# Patient Record
Sex: FEMALE | Race: WHITE
[De-identification: names, ages, dates, MRNs, and addresses within clinical notes are randomized per-mention and may not be internally consistent; named-entity substitution may affect disease eponyms.]

---

## 2017-06-30 ENCOUNTER — HOSPITAL ENCOUNTER (OUTPATIENT)
Dept: HOSPITAL 46 - OPS | Age: 62
Discharge: HOME | End: 2017-06-30
Attending: SURGERY
Payer: COMMERCIAL

## 2017-06-30 VITALS — WEIGHT: 186 LBS | BODY MASS INDEX: 29.19 KG/M2 | HEIGHT: 67 IN

## 2017-06-30 DIAGNOSIS — Z98.890: ICD-10-CM

## 2017-06-30 DIAGNOSIS — Z90.49: ICD-10-CM

## 2017-06-30 DIAGNOSIS — K20.9: ICD-10-CM

## 2017-06-30 DIAGNOSIS — I10: ICD-10-CM

## 2017-06-30 DIAGNOSIS — K29.50: Primary | ICD-10-CM

## 2017-06-30 DIAGNOSIS — Z90.710: ICD-10-CM

## 2017-06-30 DIAGNOSIS — K44.9: ICD-10-CM

## 2017-06-30 DIAGNOSIS — K31.7: ICD-10-CM

## 2017-06-30 DIAGNOSIS — K31.89: ICD-10-CM

## 2017-06-30 PROCEDURE — 0DB68ZX EXCISION OF STOMACH, VIA NATURAL OR ARTIFICIAL OPENING ENDOSCOPIC, DIAGNOSTIC: ICD-10-PCS | Performed by: SURGERY

## 2017-06-30 PROCEDURE — 0DB38ZX EXCISION OF LOWER ESOPHAGUS, VIA NATURAL OR ARTIFICIAL OPENING ENDOSCOPIC, DIAGNOSTIC: ICD-10-PCS | Performed by: SURGERY

## 2017-06-30 PROCEDURE — 0DB98ZX EXCISION OF DUODENUM, VIA NATURAL OR ARTIFICIAL OPENING ENDOSCOPIC, DIAGNOSTIC: ICD-10-PCS | Performed by: SURGERY

## 2017-06-30 PROCEDURE — 0DB28ZX EXCISION OF MIDDLE ESOPHAGUS, VIA NATURAL OR ARTIFICIAL OPENING ENDOSCOPIC, DIAGNOSTIC: ICD-10-PCS | Performed by: SURGERY

## 2017-06-30 PROCEDURE — 0DB78ZX EXCISION OF STOMACH, PYLORUS, VIA NATURAL OR ARTIFICIAL OPENING ENDOSCOPIC, DIAGNOSTIC: ICD-10-PCS | Performed by: SURGERY

## 2017-06-30 NOTE — NUR
06/30/17 Brice2 Holli Coleman
1239-PATIENT ARRIVED TO PACU ON 2L NC O2 SAT 98%. PATIENT AWAKE DENIES
PAIN OR NAUSEA. PATIENT DOES HAVE HICCUPS. DROWSY

## 2017-08-05 NOTE — OR
[facility]
                                    [address], [place]  [address]
_________________________________________________________________________________________
                                                                 Signed   
 
 
DATE OF PROCEDURE:  06/30/17
 
PREOPERATIVE DIAGNOSIS
Clinical gastroesophageal reflux disease, longstanding.
 
POSTOPERATIVE DIAGNOSES
Enterogastritis.
Poor flap valve and chronic esophagitis; no evidence of Simpson epithelium.
 
PROCEDURE:  Esophagogastroduodenoscopy with biopsy.
 
SURGEON:  Armando Sultana MD
 
ANESTHESIA:  Intravenous sedation, Fentanyl 100 mcg, Versed 3.5 mg.
 
INDICATION
A 62-year-old white woman, who is a patient of Dr. Jaime Gonzalez. She had a significant
reflux disease including spontaneous regurgitation, substernal burning pain, and so on.
She is currently on esomeprazole 40 mg p.o. q. day. She is admitted to undergo an upper
endoscopy with to better characterize her problem anticipating possible surgical
intervention in the future. She understands the risk of the procedure including but not
limited to bleeding, infection, perforation, and other unforeseen complications.
Understanding this, she wishes to proceed.
 
FINDINGS
Chronic esophagitis was noted, but there was no evidence of Simpson epithelium,
stricture, or neoplasm. There was no sign of varices. The s tomach itself had proximal
gastric polyps, a few only, probably related to PPI use. The antrum did have linear
erythematous erosive changes consistent with gastritis, but no ulcer proper, and there
was no evidence of cancer. The duodenum was normal. MARIA DEL CARMEN test was negative 15 minutes
post procedure.
 
DESCRIPTION OF PROCEDURE
The patient was brought to the endoscopy suite and given topical Hurricane spray,
hypopharyngeal anesthesia, and placed in a lateral decubitus position. She was given
intravenous sedation to the point of slurred speech and nystagmus. A bite-block was
placed. A full cardiopulmonary monitoring was maintained. An Olympus video upper
endoscope was passed in the hypopharynx. The vocal cords appeared normal. The
hypopharyngeal tissues were not pa r ticularly edematous. The scope was advanced in the
esophagus. Chronic inflammatory changes were noted in the mid and distal esophagus with
no stricture proper. The scope was passed into the stomach, insufflated with air, and
rugal folds appeared reasonably  normal. The antrum showed erythematous linear near
 
    Electronically Signed By: ARMANDO SULTANA MD  08/05/17 1025
_________________________________________________________________________________________
PATIENT NAME:     DEMARCO BYRNES                 
MEDICAL RECORD #: [id number]                     OPERATIVE REPORT              
          ACCT #: [id number]  
DATE OF BIRTH:   [birth year]                                       
PHYSICIAN:   ARMANDO SULTANA MD                      REPORT #: [id number]
REPORT IS CONFIDENTIAL AND NOT TO BE RELEASED WITHOUT AUTHORIZATION
 
 
                                  [facility]
                                    [address], [place]  [address]
_________________________________________________________________________________________
                                                                 Signed   
 
 
erosions of the pylorus. The scope was passed through the pylorus into the duodenum
which was normal. Overall, biopsies were taken from the duodenum to assess for celiac
disease. Careful inspection of bulbar portion showed no sign of ulcer. The scope was
withdrawn from the stomach where biopsies were taken from the prepyloric antrum,        
              pathologic testing. Retroflex view showed a marginal flap valve generally.
There was no evidence of Asaf ulcer. The scope was straightened a bit more and
withdrawn. Biopsy was taken of the distal esophagus and midesophagus as well. The
patient was taken to the recovery room in good condition after procedure having suffered
no complications.
 
CONCLUDING DIAGNOSES
 
Enterogastritis with proximal gastric polyps, probably related to PPI use. 
Poor flap valve and chronic esophagitis.
Enterogastritis.
 
PLAN
Recommend continued use of esomeprazole 40 mg p.o. q. day. We will check the pathology
reports. Consideration will be made for antireflux operation and another testing
appropriate to it. She will return to see us in approximately 4 weeks.
 
 
 
_____________________________
MD VILMA Gonzalez/Lisette
DD: 06/30/2017 12:49:13
DT: 07/02/2017 00:03:10
Job #: [id number]
 
cc:  Jaime Gonzalez MD
 
 
 
 
 
 
 
 
 
 
 
    Electronically Signed By: ARMANDO SULTANA MD  08/05/17 1025
_________________________________________________________________________________________
PATIENT NAME:     DEMARCO BYRNES                 
MEDICAL RECORD #: [id number]                     OPERATIVE REPORT              
          ACCT #: [id number]  
DATE OF BIRTH:   [birth year]                                       
PHYSICIAN:   ARMANDO SULTANA MD                      REPORT #: [id number]
REPORT IS CONFIDENTIAL AND NOT TO BE RELEASED WITHOUT AUTHORIZATION

## 2018-02-02 ENCOUNTER — HOSPITAL ENCOUNTER (INPATIENT)
Dept: HOSPITAL 46 - MS | Age: 63
LOS: 21 days | Discharge: HOME | DRG: 328 | End: 2018-02-23
Attending: SURGERY | Admitting: SURGERY
Payer: COMMERCIAL

## 2018-02-02 VITALS — WEIGHT: 181.99 LBS | HEIGHT: 67 IN | BODY MASS INDEX: 28.56 KG/M2

## 2018-02-02 DIAGNOSIS — K21.9: Primary | ICD-10-CM

## 2018-02-02 DIAGNOSIS — K44.9: ICD-10-CM

## 2018-02-02 NOTE — XMS
Encounter Summary
  Created on: 2018
 
 FITZ, DEMARCO JARRED
 External Reference #: [id number]
 : 53
 Sex: Female
 
 Demographics
 
 
+-----------------------+------------------------+
| Address               | [address]      |
|                       | [place]  [address]   |
+-----------------------+------------------------+
| Home Phone            | [phone]        |
+-----------------------+------------------------+
| Preferred Language    | Unknown                |
+-----------------------+------------------------+
| Marital Status        |                 |
+-----------------------+------------------------+
| Taoist Affiliation | Unknown                |
+-----------------------+------------------------+
| Race                  | White                  |
+-----------------------+------------------------+
| Ethnic Group          | Not  or  |
+-----------------------+------------------------+
 
 
 Author
 
 
+--------------+------------------------------+
| Author       | [facility] |
+--------------+------------------------------+
| Organization | [facility] |
+--------------+------------------------------+
| Address      | Unknown                      |
+--------------+------------------------------+
| Phone        | Unavailable                  |
+--------------+------------------------------+
 
 
 
 Support
 
 
+----------------+--------------+-------------------+-----------------+
| Name           | Relationship | Address           | Phone           |
+----------------+--------------+-------------------+-----------------+
| VANDA BYRNES | ECON         | [place], [place]   | [phone] |
+----------------+--------------+-------------------+-----------------+
 
 
 
 Care Team Providers
 
 
 
+-----------------------+------+-----------------+
| Care Team Member Name | Role | Phone           |
+-----------------------+------+-----------------+
| Flora Castellanos MD    | PCP  | [phone] |
+-----------------------+------+-----------------+
 
 
 
 Encounter Details
 
 
+--------+-------------+----------------------+----------------------+-------------+
| Date   | Type        | Department           | Care Team            | Description |
+--------+-------------+----------------------+----------------------+-------------+
| / | Documentati |   Otolaryngology     |   Heidi Montes   |             |
| 2018   | on          | Audiology Services   | K, AuD  [address]  |             |
|        |             | at [facility]  [address] | [facility]      |             |
|        |             |  [facility]   | [place], [place]         |             |
|        |             | Mailcode: [address]       | [id number]           |             |
|        |             | [facility] |                      |             |
|        |             |   [place], [place]       |                      |             |
|        |             | [address]           |                      |             |
|        |             | [phone]         |                      |             |
+--------+-------------+----------------------+----------------------+-------------+
 
 
 
 Social History
 
 
+----------------+-------+-----------+--------+------+
| Tobacco Use    | Types | Packs/Day | Years  | Date |
|                |       |           | Used   |      |
+----------------+-------+-----------+--------+------+
| Never Assessed |       |           |        |      |
+----------------+-------+-----------+--------+------+
 
 
 
+------------------+---------------+
| Sex Assigned at  | Date Recorded |
| Birth            |               |
+------------------+---------------+
| Not on file      |               |
+------------------+---------------+
 as of this encounter
 
 Plan of Treatment
 
 
+--------+-------------+-------------+----------------------+-------------+
| Date   | Type        | Specialty   | Care Team            | Description |
+--------+-------------+-------------+----------------------+-------------+
| / | Diagnostic  | Audiologist |   Darrick Lawrence,         |             |
|    | Visit       |             | [address] |             |
|        |             |             |  [address] [facility] |             |
|        |             |             |  [place]  [place]    |             |
|        |             |             | [id number] [phone]  |             |
|        |             |             |  [phone] (Fax)  |             |
+--------+-------------+-------------+----------------------+-------------+
 
 as of this encounter
 
 Visit Diagnoses
 Not on filein this encounter

## 2018-02-02 NOTE — XMS
Encounter Summary
  Created on: 2018
 
 FITZ, DEMARCO JARRED
 External Reference #: [id number]
 : 53
 Sex: Female
 
 Demographics
 
 
+-----------------------+------------------------+
| Address               | [address]      |
|                       | [place]  [address]   |
+-----------------------+------------------------+
| Home Phone            | [phone]        |
+-----------------------+------------------------+
| Preferred Language    | Unknown                |
+-----------------------+------------------------+
| Marital Status        |                 |
+-----------------------+------------------------+
| Baptism Affiliation | Unknown                |
+-----------------------+------------------------+
| Race                  | White                  |
+-----------------------+------------------------+
| Ethnic Group          | Not  or  |
+-----------------------+------------------------+
 
 
 Author
 
 
+--------------+------------------------------+
| Author       | [facility] |
+--------------+------------------------------+
| Organization | [facility] |
+--------------+------------------------------+
| Address      | Unknown                      |
+--------------+------------------------------+
| Phone        | Unavailable                  |
+--------------+------------------------------+
 
 
 
 Support
 
 
+----------------+--------------+-------------------+-----------------+
| Name           | Relationship | Address           | Phone           |
+----------------+--------------+-------------------+-----------------+
| VANDA BYRNES | ECON         | [place], [place]   | [phone] |
+----------------+--------------+-------------------+-----------------+
 
 
 
 Care Team Providers
 
 
 
+-----------------------+------+-----------------+
| Care Team Member Name | Role | Phone           |
+-----------------------+------+-----------------+
| Flora Castellanos MD    | PCP  | [phone] |
+-----------------------+------+-----------------+
 
 
 
 Encounter Details
 
 
+--------+-------------+----------------------+----------------------+-------------+
| Date   | Type        | Department           | Care Team            | Description |
+--------+-------------+----------------------+----------------------+-------------+
| / | Documentati |   Otolaryngology     |   Darrick Lawrence,         |             |
| 2018   | on          | Audiology Services   | [address] |             |
|        |             | at [facility]  [address] |  [address] [facility] |             |
|        |             |  [facility]   |  [address]  [place], [place]    |             |
|        |             | Mailcode: [address]       | [address]  [phone]  |             |
|        |             | [facility] |  [phone] (Fax)  |             |
|        |             |   [place], [place]       |                      |             |
|        |             | [address]           |                      |             |
|        |             | [phone]         |                      |             |
+--------+-------------+----------------------+----------------------+-------------+
 
 
 
 Social History
 
 
+----------------+-------+-----------+--------+------+
| Tobacco Use    | Types | Packs/Day | Years  | Date |
|                |       |           | Used   |      |
+----------------+-------+-----------+--------+------+
| Never Assessed |       |           |        |      |
+----------------+-------+-----------+--------+------+
 
 
 
+------------------+---------------+
| Sex Assigned at  | Date Recorded |
| Birth            |               |
+------------------+---------------+
| Not on file      |               |
+------------------+---------------+
 as of this encounter
 
 Plan of Treatment
 
 
+--------+-------------+-------------+----------------------+-------------+
| Date   | Type        | Specialty   | Care Team            | Description |
+--------+-------------+-------------+----------------------+-------------+
| / | Diagnostic  | Audiologist |   Darrick Lawrence,         |             |
| 2018   | Visit       |             | [address] |             |
|        |             |             |  [address] |             |
|        |             |             |  [place]  [place]    |             |
|        |             |             | [address]  [phone]  |             |
|        |             |             |  [phone] (Fax)  |             |
+--------+-------------+-------------+----------------------+-------------+
 
 as of this encounter
 
 Visit Diagnoses
 Not on filein this encounter

## 2018-02-02 NOTE — XMS
Encounter Summary
  Created on: 2018
 
 FITZ, ADRY JARRED
 External Reference #: [id number]
 : 53
 Sex: Female
 
 Demographics
 
 
+-----------------------+------------------------+
| Address               | [address]      |
|                       | [place]  [address]   |
+-----------------------+------------------------+
| Home Phone            | [phone]        |
+-----------------------+------------------------+
| Preferred Language    | Unknown                |
+-----------------------+------------------------+
| Marital Status        |                 |
+-----------------------+------------------------+
| Jewish Affiliation | Unknown                |
+-----------------------+------------------------+
| Race                  | White                  |
+-----------------------+------------------------+
| Ethnic Group          | Not  or  |
+-----------------------+------------------------+
 
 
 Author
 
 
+--------------+------------------------------+
| Author       | [facility] |
+--------------+------------------------------+
| Organization | [facility] |
+--------------+------------------------------+
| Address      | Unknown                      |
+--------------+------------------------------+
| Phone        | Unavailable                  |
+--------------+------------------------------+
 
 
 
 Support
 
 
+----------------+--------------+-------------------+-----------------+
| Name           | Relationship | Address           | Phone           |
+----------------+--------------+-------------------+-----------------+
| VANDA BYRNES | ECON         | [place], [place]   | [phone] |
+----------------+--------------+-------------------+-----------------+
 
 
 
 Care Team Providers
 
 
 
+-----------------------+------+-----------------+
| Care Team Member Name | Role | Phone           |
+-----------------------+------+-----------------+
| Flora Castellanos MD    | PCP  | [phone] |
+-----------------------+------+-----------------+
 
 
 
 Reason for Visit
 
 
+--------------+----------+
| Reason       | Comments |
+--------------+----------+
| Hearing loss |          |
+--------------+----------+
 Benefits Check (Routine)
 
+------------+--------+-------------+--------------+--------------+---------------+
| Status     | Reason | Specialty   | Diagnoses /  | Referred By  | Referred To   |
|            |        |             | Procedures   | Contact      | Contact       |
+------------+--------+-------------+--------------+--------------+---------------+
| Authorized |        | Audiologist |              |   [facility]   |   Ent         |
|            |        |             |              | Epic Dept    | [facility] |
|            |        |             |              |              |   [address]    |
|            |        |             |              |              | [address]   |
|            |        |             |              |              | [facility]     |
|            |        |             |              |              | Mailcode:     |
|            |        |             |              |              | [address]          |
|            |        |             |              |              | Physician's   |
|            |        |             |              |              | Pavilion      |
|            |        |             |              |              | [place], [place]  |
|            |        |             |              |              | [address]    |
|            |        |             |              |              | Phone:        |
|            |        |             |              |              | [phone]  |
|            |        |             |              |              |  Fax:         |
|            |        |             |              |              | [phone]  |
+------------+--------+-------------+--------------+--------------+---------------+
 
 
 
 
 Encounter Details
 
 
+--------+-------------+----------------------+----------------------+--------------+
| Date   | Type        | Department           | Care Team            | Description  |
+--------+-------------+----------------------+----------------------+--------------+
| / | Diagnostic  |   Otolaryngology     |   Darrick Lawrence,         | Hearing loss |
| 2018   | Visit       | Audiology Services   | BOYD Mar  [address] |              |
|        |             | at [facility]  [address] |  [address] |              |
|        |             |  [facility]   |  Rd  [place], [place]    |              |
|        |             | Mailcode: [id number]       | [id number] [phone]  |              |
|        |             | Physician's [address] |  [phone] (Fax)  |              |
|        |             |   [place], [place]       |                      |              |
|        |             | [address]           |                      |              |
|        |             | [phone]         |                      |              |
+--------+-------------+----------------------+----------------------+--------------+
 
 
 
 
 Social History
 
 
+----------------+-------+-----------+--------+------+
| Tobacco Use    | Types | Packs/Day | Years  | Date |
|                |       |           | Used   |      |
+----------------+-------+-----------+--------+------+
| Never Assessed |       |           |        |      |
+----------------+-------+-----------+--------+------+
 
 
 
+------------------+---------------+
| Sex Assigned at  | Date Recorded |
| Birth            |               |
+------------------+---------------+
| Not on file      |               |
+------------------+---------------+
 as of this encounter
 
 Progress Notes
 Isabela Patterson CCC-A - 2018  2:30 PM [facility]ar [facility]plant [facility]
 Name: Adry Byrnes 
 MRN: [id number] 
 : 1955 
 Date of Visit: 2018
 
 Clinician: Isabela Patterson M.A., CCC-A
 
 Interval: re-establishing CI care
 
 CI: Right, HA: Left (ReSound BTE) 
 : Cochlear
 Internal/External: CI24M/Freedom
 
 Cochlear Implant Programming Summary 
 
 History:
 
 Adry Byrnes, 62 y.o., was seen today at the [facility] to reestablis
h care of her right cochlear implant device.  Ms. Byrnes currently utilizes a Freedom proc
essor on her right ear.  She utilizes a ReSound hearing aid for her left ear which she purch
ased 9-12 months ago in [place], [place].  As a reminder, Ms. Byrnes's experienced hearing lo
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  She was implanted with a right CI24M pawel
ce by Dr. Ko in  and previously received CI care from \[facility]"" in [place], [place].  Ms. Diaz
ey transferred CI care to the [facility] in .  She was last seen for transf
er of care appointment on 10/26/2015. 
 
 At the present appointment, Ms. Byrnes reported that Cochlear had contacted her and said 
that her current Freedom processor has been obsolesced and she needed to initiate the proces
s for CI upgrade.  Ms. Byrnes shared that she was a little confused as she thought the pro
cessor upgrade would be here to  today.  Ms. Byrnes's [place] processor is currentl
y obsolete and will not be repaired or replaced by the .  
 
 The patient was counseled regarding CI upgrade process as well as Cochlear's processor tech
nology currently compatible with her internal device (N6 and Kanso).  Ms. Byrnes expressed
 that she would like to pursue upgrade to the Kanso speech processor.  An order form was rosita
led out and emailed to [employer] on behalf of the patient.  A letter of 
medical necessity was written and routed for physician signature.   
 
 
 Programming:
 
 Right CI:
 Ms. Byrnes's right Freedom processor was connected to the computer for programming.  Impe
dances were WNL across the electrode array.  Current MAP 20 was opened and live voice was ac
tivated.  All electrodes were functioning within compliance levels.  No changes were made to
 current settings and settings remain downloaded as follows:
 
 Map(s) Programmed:
 Program Location Map #  Description
 P1   20  Previously preferred setting, +3 cu's
 P2   18  Previously preferred setting 
 
 It was recommended that Ms. Byrnes return in 3 months for CI upgrade appointment, or befo
re that time on an as needed basis.  
 
 Ms. Byrnes also shared that she has recently been considering second side cochlear implan
t for the left ear as her hearing continues to decline, but is somewhat hesitant.  Expectati
ons regarding second side CI were discussed briefly with the patient and Ms. Byrnes was en
couraged to contact the [facility] for second side CI evaluation sequence should she dec
brissa to pursue further.  
 
 It was a pleasure working with Ms. Byrnes today.  If there are any questions regarding to
day's evaluation, please do not hesitate to contact me at [phone].
 
 Isabela Patterson M.A., CCC-A
 Clinical & Rehabilitative Audiologist
  
 [facility]
 Department of Otolaryngology
 Audiology Services
 [address]
 [address], [address]
 [place] [address]
 Ph: [phone]
 
 in this encounter
 
 Plan of Treatment
 
 
+--------+-------------+-------------+----------------------+-------------+
| Date   | Type        | Specialty   | Care Team            | Description |
+--------+-------------+-------------+----------------------+-------------+
| / | Diagnostic  | Audiologist |   Darirck Lawrence,         |             |
| 2018   | Visit       |             | BOYD [address] |             |
|        |             |             |   [address] |             |
|        |             |             |  Rd  [place], [place]    |             |
|        |             |             | [address]  [phone]  |             |
|        |             |             |  [phone] (Fax)  |             |
+--------+-------------+-------------+----------------------+-------------+
 as of this encounter
 
 Visit Diagnoses
 Not on filein this encounter

## 2018-02-02 NOTE — XMS
Clinical Summary
  Created on: 2018
 
 DEMARCO BYRNES
 External Reference #: [id number]
 : 53
 Sex: Female
 
 Demographics
 
 
+-----------------------+------------------------+
| Address               | [address]      |
|                       | [place]  [address]   |
+-----------------------+------------------------+
| Home Phone            | [phone]        |
+-----------------------+------------------------+
| Preferred Language    | Unknown                |
+-----------------------+------------------------+
| Marital Status        |                 |
+-----------------------+------------------------+
| Mandaen Affiliation | Unknown                |
+-----------------------+------------------------+
| Race                  | White                  |
+-----------------------+------------------------+
| Ethnic Group          | Not  or  |
+-----------------------+------------------------+
 
 
 Author
 
 
+--------------+-------------------------+
| Author       | [facility] |
+--------------+-------------------------+
| Organization | [facility] |
+--------------+-------------------------+
| Address      | Unknown                 |
+--------------+-------------------------+
| Phone        | Unavailable             |
+--------------+-------------------------+
 
 
 
 Support
 
 
+----------------+--------------+-------------------+-----------------+
| Name           | Relationship | Address           | Phone           |
+----------------+--------------+-------------------+-----------------+
| VANDA BYRNES | ECON         | [place]   | [phone] |
+----------------+--------------+-------------------+-----------------+
 
 
 
 Care Team Providers
 
 
 
+-----------------------+------+-----------------+
| Care Team Member Name | Role | Phone           |
+-----------------------+------+-----------------+
| Flora Castellanos MD    | PP   | [phone] |
+-----------------------+------+-----------------+
 
 
 
 Source Comments
 JUANY is fully live on both [facility] and [facility]
 
 Allergies
 Not on File
 
 Current Medications
 Not on file
 
 Active Problems
 Not on file
 
 Encounters
 
 
+--------+-------------+-----------+---------------------+--------------+
| Date   | Type        | Specialty | Care Team           | Description  |
+--------+-------------+-----------+---------------------+--------------+
| / | Documentati |           |   Heidi Montes  |              |
|    | on          |           | Carlos Alberto WALDEN              |              |
+--------+-------------+-----------+---------------------+--------------+
| / | Documentati |           |   Darrick Lawrence,        |              |
| 2018   | on          |           | JUDD Mar-A      |              |
+--------+-------------+-----------+---------------------+--------------+
| / | Diagnostic  |           |   Darrick Lawrence,        | Hearing loss |
|    | Visit       |           | JUDD Mar-A      |              |
+--------+-------------+-----------+---------------------+--------------+
 from Last 3 Months
 
 Social History
 
 
+----------------+-------+-----------+--------+------+
| Tobacco Use    | Types | Packs/Day | Years  | Date |
|                |       |           | Used   |      |
+----------------+-------+-----------+--------+------+
| Never Assessed |       |           |        |      |
+----------------+-------+-----------+--------+------+
 
 
 
+------------------+---------------+
| Sex Assigned at  | Date Recorded |
| Birth            |               |
+------------------+---------------+
| Not on file      |               |
+------------------+---------------+
 
 
 
 Plan of Treatment
 
 
 
+--------+-------------+-----------+----------------------+-------------+
| Date   | Type        | Specialty | Care Team            | Description |
+--------+-------------+-----------+----------------------+-------------+
| / | Diagnostic  |           |   Darrick Lawrence,         |             |
|    | Visit       |           | BOYD Mar  [address] |             |
|        |             |           |  [address] |             |
|        |             |           |  Rd  [place], [place]    |             |
|        |             |           | [id number] [phone]  |             |
|        |             |           |  [phone] (Fax)  |             |
+--------+-------------+-----------+----------------------+-------------+
 
 
 
+--------------------+-----------+-----------+----------+
| Health Maintenance | Due Date  | Last Done | Comments |
+--------------------+-----------+-----------+----------+
| INFLUENZA VACCINE  |  |           |          |
| (FLU SHOT)         | 7         |           |          |
+--------------------+-----------+-----------+----------+
 
 
 
 Results
 Not on filefrom Last 3 Months

## 2018-02-20 PROCEDURE — 0JH83VZ INSERTION OF INFUSION PUMP INTO ABDOMEN SUBCUTANEOUS TISSUE AND FASCIA, PERCUTANEOUS APPROACH: ICD-10-PCS | Performed by: SURGERY

## 2018-02-20 PROCEDURE — 0DV40ZZ RESTRICTION OF ESOPHAGOGASTRIC JUNCTION, OPEN APPROACH: ICD-10-PCS | Performed by: SURGERY

## 2018-02-20 NOTE — NUR
PT ARRIVED TODAY POST HILL REPAIR. MORPINE GIVEN X1 WITH GOOD RESULTS FOR PAIN
MANAGEMENT. PT REPORTS ONGOING CHRONIC PAIN IN BACK. REPOSITIONING AND HEAT
APPLIED WITH GOOD RESULTS. PT TOLERATING ICE CHIPS. SCANT AMOUNT OF RED
DRAINAGE FROM NG TUBE TO BE MONITORED. MEPLEX AND OPSITE DRESSING CDI. ON-Q
PUMP IN PLACE. DURAMORPH PROTOCOL ENDS AT 2200, PCA TO BEGIN AT THAT TIME.
BELLO TO GRAVITY, PATIENT, GOOD UO.

## 2018-02-20 NOTE — NUR
RECEIVED REPORT FROM RN. PATIENT IS RESTING IN BED, BREATHING IS EVEN AND
UNLABORED. O2 SATURATION IS 95% ON 2L O2 VIA NC. REPORTS 2/10 ABD PAIN.
APPEARS TO BE TIRED, CLOSES EYES OFF AND ON DURING REPORT. DENIES NEEDS AT
THIS TIME. NG TUBE HAS SCANT BLOOD IN TUBE, MD NOTIFIED. CALL LIGHT WITHIN
REACH,  AT BEDSIDE.

## 2018-02-20 NOTE — NUR
PATIENT RESTING COMFORTABLY IN BED, BREATHING IS EVEN AND UNLABORED. O2
SATURATION IS 95% ON 2L O2. REPORTS 2/10 PAIN IN ABD. CALL LIGHT WITHIN REACH,
FAMILY AT BEDSIDE.

## 2018-02-20 NOTE — NUR
RN CALLED  TO CLARIFY ORDERS FOR PCA AND I.V. TYLENOL ORDER IN NURSE
NOTIFY ORDER. PCA IS CONFIRMED TO BE MORPHINE AND CONFIRMED TYLENOL I.V.
ORDER.

## 2018-02-20 NOTE — NUR
02/20/18 1531 Rosita Metz 1434 PT ARRIVED IN PACU SLEEPY WITH NG TUBE IN PLACE AND PLACED ON
INTERMITENT SUCTION. ANESTHESIA AT BEDSIDE. PT'S HEARING AIDE PLACED
IN L EAR AND COCHLEAR IMPLANT PLACED ON R EAR. 1508 PT C/O ABD PAIN.
FENTANYL 25MCG GIVEN IVP. 1515 PT RESTING. REU. 1522 PT GRIMACING AND
C/O ABD PAIN. UNABLE TO RATE AT THIS TIME. FENTANYL 25MCG GIVEN IVP.
1531 PT RESTING.

## 2018-02-20 NOTE — NUR
PT RESTING IN BED REPORTS BACK SPASMS DUE TO RESTING ON HER BACK. SCANT AMOUNT
OF RED DRAINAGE IN NGT AT THIS TIME. V/S STABLE. SPOUSE AT BEDSIDE. TYLENOL
I.V. INFUSING AT THIS TIME.

## 2018-02-20 NOTE — NUR
CALLED DR. SULTANA FOR PCA ORDER VERIFICATION. PATIENT IS TO RECEIVE MORPHINE
PCA, NOT DILAUDID PCA.

## 2018-02-20 NOTE — NUR
PATIENT IS RESTING IN RIGHT SIDE, BREATHING IS EVEN AND UNLABORED. RR IS 14.
O2 SATURATION IS 95% ON 2L O2. NO NEW DRAINAGE FROM NGT. REPORTS 5/10 PAIN IN
ABD, PRN IV DILAUDID GIVEN PER EMAR. DENIES NAUSEA AT THIS TIME. RESTING
COMFORTABLY. ASSESSMENT DONE. NO FURTHER NEEDS. CALL LIGHT WITHIN REACH,
 AT BEDSIDE.

## 2018-02-20 NOTE — NUR
PT TRANSFERED TO MED/SURG FROM PACU. PT REPORTS 8/10 PAIN (SEE MAR FOR
MEDICATION GIVEN).  AT BEDSIDE. PT REPORTS DISCOMFORT IN BACK. WARM
BLANKET PROVIDED AND  SUPPORTING PT AS REQUESTED WITH HAND AT BACK.
SCDS IN PLACE. BED RAILS UP. CALL LIGHT WITHIN REACH. PT VERBALIZES
UNDERSTANDING OF PLAN OF CARE AND STATES SHE HAS NO ADDITIONAL REQUESTS OR
COMPLAINTS AT THIS TIME.

## 2018-02-20 NOTE — NUR
PATIENT RELAXING IN BED, EYES CLOSED. ASKED ME TO SHUT LIGHTS OFF WHEN I LEAVE
ROOM. FRESH WATER GIVEN, CALL LIGHT IN REACH. NO OTHER NEEDS AT THIS TIME.

## 2018-02-21 NOTE — NUR
PATIENT LYING IN BED, EYES CLOSED.  IN CHAIR, NEXT TO BED. VITALS DONE.
CALL LIGHT IN REACH NO OTHER NEEDS AT THIS TIME.

## 2018-02-21 NOTE — NUR
PCA SYRINGE CHANGED AT THIS TIME.  PATIENT RATES PAIN AT A 3/10 AT THIS TIME
BUT PAIN IS TOLERABLE AT THIS TIME.  PATIENT DENIES OTHER NEEDS AT THIS TIME.

## 2018-02-21 NOTE — NUR
DOCTOR SULTANA NOTIFIED OF BLOOD PRESSURE IN THE 80'S SYSTOLIC AT THIS TIME,
PATIENT HAS NO SYMPTOMS OF LOW BLOOD PRESSURE AT THIS TIME,  DENIES ANY PAIN
OR DIZZINESS.  PATIENT REMAINS ALERT AND ORIENTED.  IV FLUIDS CONTINUE TO RUN
INTO HER LEFT HAND WITHOUT ANY REDDNESS OR SWELLING.  PATIENT WILL SLOWLY
RAISE THE HEAD OF THE BED AS SHE TOLERATES IN PER DOCTOR SULTANA'S REQUEST.

## 2018-02-21 NOTE — NUR
RECEIVED REPORT AT 1900 FOUND PT IN BED SOMEWHAT SEDATED BUT RESPONSIVE TO
COMMANDS. O2 SATS WERE >92%,  IS ON. NO CONCERNS AT THIS TIME.

## 2018-02-21 NOTE — NUR
PATIENT HAS HAD HYPOTENTION TODAY.  DOCTOR KAYY WAS AWARE AND PLACING PATIENT
IN SEMIFOWLERS POSITION DOES ASSIST IN RAISING THE BLOOD PRESSURE.  PATIENT
HAS BEEN RESTING WELL TODAY WITH PAIN THAT IS WELL CONTROLLED WITH A MORPHINE
PCA AND AN ON Q PUMP.  SHE HAS A MIDLINE INCISION COVERED WITH A MEPILEX AND
OPSITE DRESSING, IT IS CDI.  PATIENT HAS BEEN UP AND AMBULATED TO THE BATHROOM
AND IN THE HALLWAY ONCE.  SHE IS A 1 PERSON ASSIST WITH A FWW.  SHE REMAINS ON
OXYGEN AT 1L PER NC.

## 2018-02-21 NOTE — NUR
ROUNDED AS CHARGE. PATIENT IS RESTING IN BED WITH FAMILY AT THE BEDSIDE.
PATIENT DENIES ANY  PAIN AT THIS TIME. PATIENT VERBALIZES UNDERSTADING OF PCA
FOR PAIN CONTROL. PATIENT AND PATIENTS FAMILY DENIES ANY COMMENTS QUESTIONS OR
CONCERNS. NO FURTHER NEEDS NOTED. CALL LIGHT IN REACH.

## 2018-02-21 NOTE — NUR
PATIENT RESTING COMFORTABLY IN BED, BREATHING IS EVEN AND UNLABORED. O2
SATURATION IS 96% ON 1L O2. RESPIRATORY RATE IS 10, ALL OTHER VITALS ARE
STABLE. RT TO ROOM TO EVALUATE PATIENT. LUNGS ARE CLEAR, PATIENT IS EASILY
ARROUSABLE. ENCOURAGED PATIENT TO DEEP BREATHE. REPORTS 2/10 PAIN AND STATES
"I FEEL COMFORTABLE." WILL MONITOR CLOSELY. CALL LIGHT WITHIN REACH.

## 2018-02-21 NOTE — NUR
PATIENT CALLED FOR ASSISTANCE TO BR. RN CAME IN FOR SUPPOSITORY. RN ASSISTED
PATIENT BACK TO BED. THIS CNA SET HER UP FOR BREAKFAST-OATMEAL.
PATIENT WASNT HAPPY
WITH PREVIOUS TRAY OF FOOD(EGG WAS TOO HARD AND WAS GIVEN TOO MUCH YOGURT)
CRYO FILLED, FRESH WATER GIVEN. RT IN ROOM. CALL LIGHT IN REACH. NO OTHER
NEEDS AT THIS TIME.

## 2018-02-21 NOTE — NUR
PATIENT ASSISTED BACK TO BED FROM THE CHAIR AFTER AMBULATING INTO THE
BATHROOM.  PATIENT WAS UNABLE TO VOID AT THIS TIME.  SHE STILL REMAINS A 2
PERSON ASSIST TO THE TOILET.

## 2018-02-21 NOTE — NUR
ASSISTED PATIENT TO BEDSIDE COMODE WITH 2PA. PATIENT IS WEAK, REQUIRES
ASSISTANCE WITH GETTING OUT OUT BED, WHEN STANDING, PATIENT DENIES DIZZINESS,
NO SHORTNESS OF BREATH NOTED. NOW RESTING IN BED COMFORTABLY AGAIN, BREATHING
IS EVEN AND UNLABORED. O2 SATURATION IS 95% ON 1L O2 VIA NC. PCA PUMP
OPERATING APPROPRIATELY. DENIES FRUTHER NEEDS. CALL LIGHT WITHIN REACH, FAMILY
AT BEDSIDE.

## 2018-02-21 NOTE — NUR
patient ambulated up to the nurses station and then back to the bathroom with
a front wheeled walker and 1 person assist.  patient was able to void in the
toilet but missed the hat.  patient back to bed with hob elevated.  pain level
at this time in her abdomen 2/10.

## 2018-02-21 NOTE — NUR
PATIENT'S BLOOD PRESSURE /49 WITH A PULSE OF 66.  PATIENT PLACED ON HER
BACK AND THE HOB ELEVATED A LITTLE MORE AT THIS TIME.  PATIENT RESTING WITH
EYES CLOSED.

## 2018-02-21 NOTE — NUR
PATIENT RESTING COMFORTABLY IN BED, BREATHING IS EVEN AND UNLABORED. RR IS 12,
O2 SATURATION IS 95% ON 1L O2. SMALL AMOUNT OF BROWN DRAINAGE FROM NGT NOTED.
REPORTS PAIN IS 2/10, PCA PUMP WORKING APPROPRIATELY. DENIES NEEDS. ASSESSMENT
DONE. CALL LIGHT WITHIN REACH, FAMILY AT BEDSIDE.

## 2018-02-21 NOTE — NUR
PATIENT RESTING COMFORTABLY IN BED, BREATHING IS EVEN AND UNLABORED. O2
SATURATION IS 95% ON 1L O2. DENIES NEEDS AT THIS TIME. NGT CLAMPED, PROVIDED
JELLO. WILL MONITOR. REPORTS 2/10 PAIN WITH PCA. CALL LIGHT WITHIN REACH,
FAMILY AT BEDSIDE.

## 2018-02-21 NOTE — NUR
PATIENT RESTING WITH EYES CLOSED,  HOB ELEVATED AND RESPIRATIONS EVEN
UNLABORED.   DENIES ANY QUESTIONS OR CONCERNS AT THIS TIME

## 2018-02-21 NOTE — NUR
PT RESTING IN BED LAYING ON LEFT SIDE WHILE VITALS WERE BEING TAKEN. INITIAL
BP OF 90/33 ON RIGHT ARM, PULSE 68, MAP OF 46. BP CUFF WAS REPOSITIONED AND BP
WAS THEN 86/31 RA, HR 68, MAP 41. PATIENT DENIES COMPLAINTS OF DIZZINESS,
NAUSEA, HEADACHE, OR FEELINGS OF LIGHTHEADEDNESS, PT ABLE TO ANSWER QUESTIONS
APPROPRIATELY, MD NOTIFIED BY PRIMARY RN, WILL CONTINUE TO MONITOR PT PER MD
REQUEST.

## 2018-02-21 NOTE — NUR
PATIENT SITTING UP IN BED, VITALS TAKEN.  SHE REMAINS HYPOTENSIVE, WILL
CONTINUE TO MONITOR BP AND PATIENTS PAIN LEVEL.  SHE HAS NO S/S OF HYPOTENTION
AT THIS TIME.

## 2018-02-21 NOTE — NUR
REPORT RECEIVED FROM BRANDO LAM AT THIS TIME.  PATIENT IS SITTING UP IN BED, RAILS
UP AND PATIENT CALL LIGHT IS WITHIN REACH AT THIS TIME.

## 2018-02-21 NOTE — NUR
PATIENT ASSISTED TO THE RESTROOM AS A SBA. PATIENT IS STEADY ON HER FEET.
PATIENT STATES SHE IS PAINFUL WITH MOVEMENT. PATIENT IS BACK IN BED WITH SCDS
AND PULSE OX IN PLACE. PATIENT IS USING PCA APPROPRIATELY. NO FURTHER NEEDS
NOTED. CALL LIGHT IN REACH.

## 2018-02-21 NOTE — NUR
PATIENT SITTING UP IN CHAIR WORKING ON CLEAR LIQUID BREAKFAST. VISITOR IN
ROOM. PATIENT USING WASHCLOTH FOR FACE AND A COOL ONE FOR BACK OF NECK.  CALL
LIGHT IN REACH. NO OTHER NEEDS.

## 2018-02-21 NOTE — NUR
PATIENT HAD A GOOD NIGHT OVERALL. HAS BEEN RESTING THROUGHOUT SHIFT. VSS,
URINE OUTPUT QS. PAIN HAS BEEN WELL CONTROLLED WITH MORPHINE PCA. CONSISTENTLY
RATES PAIN AT 2/10 WHICH PATIENT STATES IS TOLERABLE FOR HER. INCREASED PAIN
WITH MOVEMENT. INCISION REMAINS CDI, ABD PAD APPLIED TO DRESSING FOR SMALL
AMOUNT OF DRAINAGE FROM ON-Q PUMP INSERTION SITE. PATIENT IS ALERT AND
ORIENTED, DROWSY, RESPONDS APPROPRIATELY. LUNGS ARE CLEAR, BREATHING IS
SHALLOW. REQUIRED 1L O2 TO MAINTAIN SATS. BOWEL TONES ARE RARE, NON-DISTENDED.
REQUIRES 2PA TO BEDSIDE COMODE. IV FLUIDS INFUSING. NO ACUTE CHANGES FROM
BEGINNING OF SHIFT.

## 2018-02-21 NOTE — NUR
PATIENT TRANSFERRED 2 PERSON ASSIST UP TO THE CHAIR.  PATIENT REQUESTED TO
LEAVE THE NG TUBE IN AT THIS TIME DUE TO HX OF NAUSEA.  PATIENT IS TOLERATING
SIPS OF CLEAR LIQUIDS AND HAS NO C/O NAUSEA AT THIS TIME.  SHE IS BURPING HAS
SOME HYPOACTIVE BOWEL TONES BUT DENIES PASSING GAS.  PATIENT IS ALERT AND
ORIENTED AND IS ON 2L OF OXYGEN PER NC.  MIDLINE DRESSING IS CDI WITH MEPILEX
AND OPSITE AND ON Q PUMP INTACT.

## 2018-02-22 NOTE — NUR
IN TO ROOM, ASSISTED PATIENT TO BATHROOM WITH 2PA WITH FWW. PATIENT REQUESTED
TO GO FOR A WALK. PATIENT WALKED FROM ROOM TO THE NURSES STATION AND BACK TO
HER ROOM. SHIFT ASSESSMENT COMPLETED. PATIENT REPORTED 2/10 ABD PAIN WHICH IS
TOLERABLE FOR HER. MID LINE INCISION WNL, 2 ONQ PUMPS IN PLACE WITH SOME
LEAKING AND COVER WITH ABD PAD. IV SITE ON THE AC PATENT. BOWEL TONE POSITIVE.
SCD ON. PATIENT IS ON 1L O2. DESATED WITH ACTIVITY WITHOUT OXYGEN.
NO FLATUS. DENIES NAUSEA. TOLERATED CLEAR LIQUID DIET WELL. WILL CONTINUE TO
MONITOR.

## 2018-02-22 NOTE — NUR
BEDSIDE REPORTS RECEIVED FROM PAM. PATIENT AWAKE A&O, SITTING IN THE
CHAIR, DENIES NAUSEA, REPORTS 3/10 PAIN. MIDLINE INCISION WNL, ONQ PUMP WNL.
MORPHINE PCA IN USE. CALL LIGHT IN REACH. FAMILY IN ROOM VISITING.

## 2018-02-22 NOTE — NUR
ABD DRESSING AND ON Q PUMPS REMOVED PER MD ORDER. PATIENT TOLERATED WELL.
MIDLINE INCISION WITH STERI STRIP AND WNL.

## 2018-02-22 NOTE — NUR
RECEIVED REPORT AT 1900 AND FOUND PT UP IN CHAIR EATING A FULL LIQUID DINNER.
PT HAD NO IMMEDIATE CONCERNS.

## 2018-02-22 NOTE — NUR
V/S OVERALL ARE WDL, PT AT TIMES IS DROUSY FROM PCA. PT HAS ABD SOUNDS PRESENT
BUT HAS NOT PASSED GAS SO FAR. PT DENIES N/V AND IS TOLERATING CLEAR LIQUIDS.
ALL LOBES ARE CLEAR. PT IS USING INSENT. MARIANA. AND WAS EDUCATED ON DEEP
BREATHING. ABD DRESSING IS C/D/I, LEFT ON-Q PUMP IS LEAKING SOME UNDER THE
DRESSING. PT IS AWARE THAT SWITCH TO PO MEDS WILL BE MADE TODAY. NO NEW
CONCERNS AT THIS TIME.

## 2018-02-22 NOTE — NUR
ROUNDED AS CHARGE. PATIENT IS RESTING IN RECLINER VISITING WITH FAMILY.
PATIENT DENIES ANY NEEDS AT THIS TIME. PATIENT DENIES ANY PAIN. CALL LIGHT IN
REACH.

## 2018-02-22 NOTE — NUR
DR SULTANA WAS IN TO SEE AND REEVALUATE PATIENT AND DISCUSS PLAN OF CARE. PLAN
TO ADVANCED DIET TO FULL LIQUID, DC ABD DRESSING AND ONQ PUMPS. PATIENT
RESTING IN THE CHAIR REPORTED MINIMAL PAIN, NO NAUSEA. FOOD ORDERED. FAMILY AT
BEDSIDE.

## 2018-02-22 NOTE — NUR
V/S ARE WDL, ABD SOUNDS ARE PRESENT, INCISION IS WELL APPROXIMATED WITH A
SCANT AMOUNT OF DRY SEROUSANG. DRAINAGE. PT IS TOLERATING FULL LIQUID. PAIN IS
WELL CONTROLLED WITH PO PAIN MEDS. PT OVERALL IS DOING MUCH BETTER. PT IS
PASSING GAS. ALL LOBES ARE CLEAR. PT IS ON 1L OF O2 WHILE SHE IS SLEEPING.
WILL D/C IN THE AM.
NO NEW ISSUES NOTED SO FAR.

## 2018-02-22 NOTE — NUR
PATIENT HAD DONE WELL TODAY. HAD BEEN UP TO CHAIR MOST OF THE SHIFT. AMBULATED
TWICE IN THE HALLWAY. ABD DRESSING AND ON Q PUMPS REMOVED THIS PM. INCISION
DRY AND CLEAN WITH STERI STRIP. BOWEL TONE POSITIVE. PATIENT REPORTED PASSING
FLATUS THIS PM. TOLERATED FULL LIQUID WELL. HAD PO DILAUDID TWICE. GOOD URINE
OUTPUT.

## 2018-02-23 NOTE — NUR
BEDSIDE REPORT RECEIVED FROM PAM. PATIENT AWAKE UP TO CHAIR. MORE ALERT
THIS MORNING. REPORT MINIMAL ABD PAIN. REPORTS THAT SHE HAD A BOWEL MOVEMENT
THIS AM. BREAFAST ORDERED. CALL LIGHT IN REACH. FAMILY IN ROOM.

## 2018-02-23 NOTE — NUR
IN TO ROOM TO ASSESS PATIENT. PATIENT UP SITTING IN CHAIR, DENIES NAUSEA.
REPORTS MINIMAL PAIN. MIDLINE INCISION OPEN TO AIR AND WNL. PATIENT REPORTS
HAVING A BM THIS MORNING. LUNGS CLEAR. POSITIVE BOWEL TONE. IV SITE PATENT.
NO OTHER REQUEST OR NEEDS AT THIS TIME.

## 2018-02-23 NOTE — NUR
AT 0040 MD SULTANA WAS CALLED FOR A HOME MED PT WANTED TO ADD FOR RESTLESS LEG
SYNDROME. ORDER WAS GIVEN.
PT AT THIS TIME IS IN BED SLEEPING AT THIS TIME.

## 2018-02-23 NOTE — NUR
PATIENT IS SITTING UP IN CHAIR WOULD LIKE TO TAKE A SHOWER AFTER SHE EATS
BREAKFAST. SET HER SHOWER UP FOR HER. CHANGED LINENS.

## 2018-02-23 NOTE — NUR
V/S ARE WDL, ABD SOUNDS ARE PRESENT, PT IS PASSING AS AND HAS HAD A BM THIS
AM. BM WAS LIQUIDY. PT IS TOLERATING FULL LIQUID DIET. NO NEED FOR PAIN
MEDICATION SO FAR. ABD INCISION IS TAMIKO WITH STERI STRIPS, D/I. ABD IS MILDLY
DISTENDED AND TENDER TO TOUCH.
NO NEW CONCERNS FOR THIS PT SO FAR.

## 2018-03-08 NOTE — OR
[facility]
                                    [address], [place]  [address]
_________________________________________________________________________________________
                                                                 Signed   
 
 
DATE OF OPERATION:
02/20/2018
 
SURGEON:
Armando Sultana MD
 
PREOPERATIVE DIAGNOSIS:
Medically refractory gastroesophageal reflux disease including spontaneous regurgitation
and small hiatal hernia. 
 
POSTOPERATIVE DIAGNOSIS:
Medically refractory gastroesophageal reflux disease including spontaneous regurgitation
and small hiatal hernia. 
 
PROCEDURES:
1. Hill repair (reconstruction of the gastroesophageal junction with posterior
gastropexy). 
2. Intraoperative manometrics __________.
3. Placement of On-Q pain pump catheters (subperitoneal bilateral subcostal).
 
ANESTHESIA:
General endotracheal (Aniya Matamoros CRNA).
 
INDICATION:
This 63-year-old white woman who is previously a patient of Dr. Bryan Gonzalez (now
retired) and now a patient of Leeroy Smith of [place], [place].  She has had
longstanding gastroesophageal reflux symptoms including substernal and epigastric pain.
She has not had dysphagia.  She is unresponsive to Prilosec, but markedly benefitted by
Nexium.  She has had progression of her symptoms and is debilitated by them at this
point.  She does take Tums and other over-the-counter medications at night-time, but is
bothered by spontaneous regurgitation and so on.  A video esophagram was performed
showing normal motility grossly, though there was a cricopharyngeal bar of the proximal
esophagus.  She has undergone upper endoscopy showing no evidence of neoplasm or Simpson
epithelium and a small hiatal hernia is noted.  She has undergone cardiac evaluation by
Dr. Rae in [place], [place] and considered unlikely to have cardiac symptoms. 
 
After full consideration of her many options for reflux management, she wishes to
proceed with operation as I have offered.  This includes Hill posterior gastropexy by
open technique. 
 
She and her  understood the risks of bleeding, infection, dysphagia-either short
or long-term, recurrent reflux or persistent reflux, failure to cure her symptoms of
 
    Electronically Signed By: ARMANDO SULTANA MD  03/08/18 1411
_________________________________________________________________________________________
PATIENT NAME:     DEMARCO BYRNES                 
MEDICAL RECORD #: [id number]            OPERATIVE REPORT              
          ACCT #: [id number]  
DATE OF BIRTH:   [birth year]            REPORT #: [id number]      
PHYSICIAN:        ARMANDO SULTANA MD                 
PCP:              KACEY SMITH NP          
REPORT IS CONFIDENTIAL AND NOT TO BE RELEASED WITHOUT AUTHORIZATION
 
 
                                  [facility]
                                    [address], [place]  [address]
_________________________________________________________________________________________
                                                                 Signed   
 
 
pain and reflux, and other unforeseen complications.  Understanding this, she wished to
proceed. 
 
FINDINGS:
She has some obesity, but not excessive.  The GE junction showed a small hiatal hernia.
No large hiatal hernia and certainly no paraesophageal hernia.  The spleen was normal.
There was surgical absence of the gallbladder.  Palpation of the colon did reveal some
diverticula, particularly in the sigmoid, but no active or acute inflammatory change.
The stomach itself was normal.  Reconstruction of the GE junction was undertaken in the
typical way for Hill repair including anterior and posterior phrenoesophageal bundles
pexed to the preaortic fascia.  The resultant flap valve was optimal.  Intraoperative
manometrics were performed showing a good waveform.  The peak pressure of about 40-45
mmHg over a 4 cm intra-abdominal segment.  This was reproducible.  There were no other
findings of concern. 
 
DESCRIPTION OF PROCEDURE:
The patient was brought to the operating room, given a general endotracheal anesthetic.
A Celeste catheter was placed.  Preoperative antibiotic Ancef was given.  Sequential
compression device stockings used and heparin subcutaneously administered.  The abdomen
was prepared with a chlorhexidine solution and draped sterilely.  The anesthetist
attempted to pass a manometric tube through the nose, but this was unsuccessful and
therefore passed it through the mouth.  An upper midline incision was made minimizing
the incision as much as possible and dissection was carried through the subcutaneous
tissue and electrocautery used for hemostasis.  The abdomen was entered without problem.
 There was fatty tissue within the abdomen as expected.  The round ligament and
falciform were freed from the abdominal wall extended over the liver itself noting the
diaphragm to be without abnormality otherwise.  The liver was reasonably normal.
Certainly, no excessive fatty infiltration of liver.  There was scar tissue in the right
subhepatic space consistent with prior cholecystectomy a number of years ago. 
 
The liver was palpated and found to be normal.  A rolled laparotomy pack was placed
behind it to take pressure off the medial aspect.  The left lateral segment of the liver
was not excessively long.  It was freed from the diaphragm using blunt or electrocautery
dissection mindful of the phrenic vein coursing nearby. 
 
The lesser sac was incised with electrocautery and the incision extended to the GE
junction.  An upper hand retractor was used for exposure.  Subsequently, a Bookwalter
retractor was affixed to the lower aspect of the table allowing for retraction of the
left lateral segment of liver medially exposing well the caudate lobe, the vena cava and
the right joana of the diaphragm. 
 
Using electrocautery, the margin between the right joana and the phrenoesophageal bundle
 
    Electronically Signed By: ARMANDO SULTANA MD  03/08/18 1411
_________________________________________________________________________________________
PATIENT NAME:     DEMARCO BYRNES                 
MEDICAL RECORD #: [id number]            OPERATIVE REPORT              
          ACCT #: [id number]  
DATE OF BIRTH:   [birth year]            REPORT #: [id number]      
PHYSICIAN:        ARMANDO SULTANA MD                 
PCP:              KACEY SMITH NP          
REPORT IS CONFIDENTIAL AND NOT TO BE RELEASED WITHOUT AUTHORIZATION
 
 
                                  [facility]
                                    [address], [place]  [address]
_________________________________________________________________________________________
                                                                 Signed   
 
 
was freed.  A Girma clamp was applied to the right joana and dissection carried
cephalad.  There was a bit of scar tissue in this area and meticulous care was
maintained to free the muscular fibers from the esophagus itself.  Dissection was
carried more cephalad in the region of the gastrophrenic attachments, which were freed
as well.  A hook retractor was used to elevate the GE junction revealing the left joana,
which was similarly freed with blunt and electrocautery dissection.  Meticulous care was
maintained to free the GE junction fully including into the posterior mediastinum.  The
anterior and posterior vagal nerve trunks were identified and unencumbered. 
 
Once the GE junction was fully freed, assessment of the preaortic fascia was undertaken.
 Given the relatively small incision and mindful of the very stout preaortic fascia, it
was deemed most advisable to avoid passage of a clamp as it was often the case
superficial to the aorta, but simply pexed to the right and left crura elevating the
preaortic fascia inferiorly for security of the repair.  Using 0 silk sutures with
Khris felt pledgets soaked in Betadine, the right and left crura were reapproximated.
The index finger could easily pass alongside the esophagus.  Girma clamps were applied
to the anterior and posterior phrenoesophageal bundles and mindful of the position of
the vagal nerves, repair sutures made using 0 Ethibond suture with Khris pledget soaked
in Betadine.  A seromuscular bite as well as the phrenoesophageal bundle was taken
anteriorly and posteriorly, and also through the preaortic fascia elevating it with the
silk suture used to reapproximate it more cephalad.  Four such repair sutures were
placed.  They were secured with surgeon's throw.  Palpation of the resultant flap valve
showed it to be quite optimal. 
Intraoperative manometrics were then undertaken.  Using a perfusion catheter with serial
blood pressure catheter setup, a pullout pressure was undertaken showing a peak pressure
of approximately 40-45 mmHg over a 4 cm intra-abdominal segment, the sutures were more
fully secured to the preaortic fascia and the fundus of the stomach secured to the right
joana and the tendon of the diaphragm so as to avoid postoperative paraesophageal
herniation of the fundus.  Repeat pullout pressure was undertaken finding the waveform
to be optimal and the affected durable.  The sutures had been secured fully and were
trimmed to a short length at this point.  Palpation of flap valve showed it to be quite
optimal.  Irrigation was undertaken.  There was no sign of bleeding or other problems.
The roll pack behind the spleen was removed and the left lateral segment of liver
allowed to return to its natural position. 
 
Bilateral subcostal On-Q pain pump catheters were placed under direct visualization
using the tunneling device.  They were placed just below the peritoneal liver along the
costal margin bilaterally.  These were later adjusted to length.  The midline fascia was
then reapproximated with running #1 PDS suture.  The subcutaneous tissue was irrigated
and skin closed with running subcuticular 3-0 Vicryl.  Steri-Strips were applied as was
a Mepilex silver sponge dressing and Op-Site. 
Catheters had been secured to the Marcaine infusion bulb as well.  The patient was
 
    Electronically Signed By: ARMANDO SULTANA MD  03/08/18 1411
_________________________________________________________________________________________
PATIENT NAME:     DEMARCO BYRNES                 
MEDICAL RECORD #: [id number]            OPERATIVE REPORT              
          ACCT #: [id number]  
DATE OF BIRTH:   [birth year]            REPORT #: [id number]      
PHYSICIAN:        ARMANDO SULTANA MD                 
PCP:              KACEY SMITH NP          
REPORT IS CONFIDENTIAL AND NOT TO BE RELEASED WITHOUT AUTHORIZATION
 
 
                                  [address]  [address]
_________________________________________________________________________________________
                                                                 Signed   
 
 
ultimately extubated, anticipating transfer to the recovery room in good condition.
Blood loss was less than 25 mL.  Most likely sponge, needle, and instrument counts
reported as correct x3. 
 
 
 
            ________________________________________
            MD VILMA Gonzalez/KARLA
Job #:  [id number]
DD:  02/20/2018 14:33:34
DT:  02/20/2018 19:39:12
 
cc:            MD Kacey Blackburn NP
 
 
 
 
 
 
 
 
 
 
 
 
 
 
 
 
 
 
 
 
 
 
 
 
 
    Electronically Signed By: ARMANDO SULTANA MD  03/08/18 1411
_________________________________________________________________________________________
PATIENT NAME:     DEMARCO BYRNES                 
MEDICAL RECORD #: [id number]            OPERATIVE REPORT              
          ACCT #: [id number]  
DATE OF BIRTH:   [birth year]            REPORT #: [id number]      
PHYSICIAN:        ARMANDO SULTANA MD                 
PCP:              KACEY SMITH NP          
REPORT IS CONFIDENTIAL AND NOT TO BE RELEASED WITHOUT AUTHORIZATION

## 2018-03-08 NOTE — DS
[facility]
                                    [address], [place]  [address]
_________________________________________________________________________________________
                                                                 Signed   
 
 
ADMISSION DATE:  02/20/2018
 
DISCHARGE DATE:  02/23/2018
 
REASON FOR ADMISSION:
This 63-year-old white woman is a patient of Kacey Smith NP, of [place], [place]
previously, Dr. Bryan Gonzalez in [place].  She has had longstanding reflux symptoms.
She was admitted to undergo open Hill posterior gastropexy for reflux management. 
 
PERTINENT PHYSICAL EXAM:
GENERAL:  Pleasant white woman who is in no acute distress. 
HEENT:  She has a cochlear implant and has some hearing deficit.  Trachea is midline. 
CHEST:  Clear. 
HEART:  Regular without murmur. 
ABDOMEN:  Soft and slightly obese. 
EXTREMITIES:  Without clubbing, cyanosis, or edema.
 
HOSPITAL COURSE:
On February 20, 2018, she underwent Hill repair (reconstruction of the gastroesophageal
junction with posterior gastropexy as well as intraoperative manometrics and placement
of On-Q pain pump catheter).  Her operation went very well and a good pullout pressure
was noted with a peak pressure of 45 mmHg over a 4 cm intraabdominal segment.  An
optimal flap valve was reconstructed. 
 
Postoperatively, a nasogastric tube was maintained overnight and removed the next day
and she was begun on a liquid diet and advanced promptly to full liquids and ultimately
a mechanical soft diet. 
 
She was transitioned from a PCA device and On-Q pain pump catheters to oral analgesics
only.  She did have IV Tylenol and IV Ketoralac early on.  By the time of discharge, she
is ambulating well, tolerating a mechanical soft diet, has no reflux symptoms at all and
taking her usual medications. 
 
FOLLOWUP PLAN:
She is return to see me in approximately 4 weeks or so.  She was advised to avoid meat
and bread for the time being and excessively cold or hot fluids or carbonated fluids for
now.  In time, she will have no restrictions on her diet. 
 
DISCHARGE MEDICATIONS:
1. Dilaudid 4 mg tablets 1-2 p.o. q.4 hours p.r.n. pain, #20.
2. Potassium chloride tablets 20 mEq p.o. daily.
3. Paroxetine (Paxil 40 mg) p.o. daily.
 
    Electronically Signed By: ARMANDO SULTANA MD  03/08/18 1411
_________________________________________________________________________________________
PATIENT NAME:     DEMARCO BYRNES                 
MEDICAL RECORD #: [id number]            DISCHARGE SUMMARY             
          ACCT #: [id number]  
DATE OF BIRTH:   [birth year]            REPORT #: [id number]      
PHYSICIAN:        ARMANDO SULTANA MD                 
PCP:              KACEY SMITH NP          
REPORT IS CONFIDENTIAL AND NOT TO BE RELEASED WITHOUT AUTHORIZATION
 
 
                                  [facility]
                                    [address], [place]  [address]
_________________________________________________________________________________________
                                                                 Signed   
 
 
4. Atorvastatin (Lipitor 10 mg) p.o. daily.
5. Fluticasone one spray each nostril daily.
6. Lisinopril 10 mg p.o. daily.
7. Ropinirole (Requip 1 mg tablet) two times a day.
8. Estrace 0.5 mg p.o. daily.
9. Tylenol tablet 325 mg 1-2 tablets p.o. q.4 hours p.r.n. pain. 
She will discontinue her moxifloxacin, __________ omeprazole and large potassium
chloride tablets. 
 
DISCHARGE DIAGNOSES:
1. Medically refractory gastroesophageal reflux.
2. Status post open Hill repair (reconstruction of the gastroesophageal junction with
posterior gastropexy and intraoperative manometrics). 
3. Hearing deficit with cochlear implant.
4. Hypertension.
5. Dyslipidemia.
6. Chronic hypokalemia.
 
 
 
            ________________________________________
            MD VILMA Gonzalez/HECTORL
Job #:  [id number]
DD:  02/23/2018 09:53:21
DT:  02/24/2018 05:15:01
 
cc:            Kacey Smith NP
 
 
Copies:  KACEY SMITH NP
~
 
 
 
 
 
 
 
 
 
    Electronically Signed By: ARMANDO SULTANA MD  03/08/18 1411
_________________________________________________________________________________________
PATIENT NAME:     DEMARCO BYRNES                 
MEDICAL RECORD #: [id number]            DISCHARGE SUMMARY             
          ACCT #: [id number]  
DATE OF BIRTH:   [birth year]            REPORT #: [id number]      
PHYSICIAN:        ARMANDO SULTANA MD                 
PCP:              KACEY SMITH NP          
REPORT IS CONFIDENTIAL AND NOT TO BE RELEASED WITHOUT AUTHORIZATION

## 2025-02-11 NOTE — NUR
Addended by: LIBERTY MCGUIRE on: 2/11/2025 11:08 AM     Modules accepted: Orders     V/S ARE WDL AT THIS TIME. ALL LOBES ARE CLEAR, PT IS STILL USING PCA. ABD
DRESSING IS C/D/I, ON-Q PUMPS ARE IN PLACE BUT THERE IS SOME LEAKAGE AROUND
THE LEFT INSERTION SITE. PT HAS BOWEL TONES BUT HAS NOT PASSED GAS SO FAR. PT
IS TOLERATING CLEAR LIQUID DIET SO FAR.